# Patient Record
Sex: FEMALE | Race: WHITE | NOT HISPANIC OR LATINO | URBAN - METROPOLITAN AREA
[De-identification: names, ages, dates, MRNs, and addresses within clinical notes are randomized per-mention and may not be internally consistent; named-entity substitution may affect disease eponyms.]

---

## 2019-01-27 ENCOUNTER — EMERGENCY (EMERGENCY)
Facility: HOSPITAL | Age: 24
LOS: 1 days | Discharge: HOME | End: 2019-01-27
Admitting: EMERGENCY MEDICINE
Payer: COMMERCIAL

## 2019-01-27 VITALS
DIASTOLIC BLOOD PRESSURE: 78 MMHG | RESPIRATION RATE: 18 BRPM | OXYGEN SATURATION: 98 % | HEART RATE: 88 BPM | TEMPERATURE: 98 F | SYSTOLIC BLOOD PRESSURE: 145 MMHG

## 2019-01-27 PROCEDURE — 99283 EMERGENCY DEPT VISIT LOW MDM: CPT

## 2019-01-27 RX ORDER — IBUPROFEN 200 MG
600 TABLET ORAL ONCE
Qty: 0 | Refills: 0 | Status: COMPLETED | OUTPATIENT
Start: 2019-01-27 | End: 2019-01-27

## 2019-01-27 RX ADMIN — Medication 600 MILLIGRAM(S): at 18:12

## 2019-01-27 NOTE — ED ADULT TRIAGE NOTE - CHIEF COMPLAINT QUOTE
pt states she fell down a flight of stairs earlier and now has a headache with dizziness. denies any LOC and vision changes.

## 2019-01-27 NOTE — ED PROVIDER NOTE - NEUROLOGICAL, MLM
Alert and oriented, no focal deficits, no motor or sensory deficits. Alert and oriented, no focal deficits, no motor or sensory deficits. CN II-XII intact, strength 5/5 x 4, stable gait, normal point to point and rapid alternating movements.

## 2019-01-27 NOTE — ED PROVIDER NOTE - NSFOLLOWUPCLINICS_GEN_ALL_ED_FT
[facility]  Neurology  [address] [place] [address]  Phone: [phone]  Fax: [phone]  Follow Up Time:

## 2019-01-27 NOTE — ED PROVIDER NOTE - MEDICAL DECISION MAKING DETAILS
pt with minor head injury from fall from steps hitting head on wall.  pt with nonfocal exam.  symptoms likely related to mild concusion and pt to f/u with neurology and take motrin for pain as needed.  low suspicion for bleeding as pt with normal exam, no loc and improvement of symptoms from fall 5 hours ago.

## 2019-01-27 NOTE — ED ADULT NURSE NOTE - NSIMPLEMENTINTERV_GEN_ALL_ED
Implemented All Universal Safety Interventions:  Drain to call system. Call bell, personal items and telephone within reach. Instruct patient to call for assistance. Room bathroom lighting operational. Non-slip footwear when patient is off stretcher. Physically safe environment: no spills, clutter or unnecessary equipment. Stretcher in lowest position, wheels locked, appropriate side rails in place.

## 2019-01-27 NOTE — ED PROVIDER NOTE - OBJECTIVE STATEMENT
22 y/o female with no PMHx presents to ED with c/o HA s/p fall. She states she was walking down stairs of [employer] building today when she fell down after a couple of steps. Pt is unsure of how many stairs she fell down, but notes she hit her head on the wall after falling. She notes no LOC, but reports blurry vision disorientation s/p fall, and dizziness while walking. Patient describes pain as persistent "pounding" sensation inside of head. She denies vomiting, LOC. 24 y/o female with no PMHx presents to ED with c/o HA s/p fall. She states she was walking down stairs of apt. building today when she fell down a couple of steps. Pt notes she hit her head on the wall after falling. She notes no LOC, but reports initially feeling dizzy and disoriented which resolved shortly after.  Patient describes pain as persistent "throbbing" sensation to entire head. She denies vomiting, LOC, neck pain, or other injuries.  Pt reports incident occurred 6 hours ago with no worsening of symptoms and states "I just wanted to get checked out"  Pt denies taking anything for pain.

## 2019-01-31 DIAGNOSIS — R51 HEADACHE: ICD-10-CM
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
## 2019-01-31 DIAGNOSIS — S09.90XA UNSPECIFIED INJURY OF HEAD, INITIAL ENCOUNTER: ICD-10-CM
